# Patient Record
Sex: FEMALE | URBAN - METROPOLITAN AREA
[De-identification: names, ages, dates, MRNs, and addresses within clinical notes are randomized per-mention and may not be internally consistent; named-entity substitution may affect disease eponyms.]

---

## 2021-07-07 ENCOUNTER — APPOINTMENT (RX ONLY)
Dept: URBAN - METROPOLITAN AREA CLINIC 124 | Facility: CLINIC | Age: 51
Setting detail: DERMATOLOGY
End: 2021-07-07

## 2021-07-07 DIAGNOSIS — L82.1 OTHER SEBORRHEIC KERATOSIS: ICD-10-CM

## 2021-07-07 DIAGNOSIS — D22 MELANOCYTIC NEVI: ICD-10-CM

## 2021-07-07 DIAGNOSIS — Z71.89 OTHER SPECIFIED COUNSELING: ICD-10-CM

## 2021-07-07 PROBLEM — D22.5 MELANOCYTIC NEVI OF TRUNK: Status: ACTIVE | Noted: 2021-07-07

## 2021-07-07 PROBLEM — D22.39 MELANOCYTIC NEVI OF OTHER PARTS OF FACE: Status: ACTIVE | Noted: 2021-07-07

## 2021-07-07 PROCEDURE — 99203 OFFICE O/P NEW LOW 30 MIN: CPT

## 2021-07-07 PROCEDURE — ? COUNSELING

## 2021-07-07 PROCEDURE — ? DEFER

## 2021-07-07 PROCEDURE — ? SUNSCREEN RECOMMENDATIONS

## 2021-07-07 ASSESSMENT — LOCATION ZONE DERM
LOCATION ZONE: FACE
LOCATION ZONE: TRUNK

## 2021-07-07 ASSESSMENT — LOCATION SIMPLE DESCRIPTION DERM
LOCATION SIMPLE: LEFT CHEEK
LOCATION SIMPLE: RIGHT CHEEK
LOCATION SIMPLE: RIGHT UPPER BACK

## 2021-07-07 ASSESSMENT — LOCATION DETAILED DESCRIPTION DERM
LOCATION DETAILED: LEFT INFERIOR CENTRAL MALAR CHEEK
LOCATION DETAILED: RIGHT MEDIAL MALAR CHEEK
LOCATION DETAILED: RIGHT MEDIAL UPPER BACK

## 2021-07-07 NOTE — PROCEDURE: DEFER
Reason To Defer Override: [place]
Detail Level: Detailed
Introduction Text (Please End With A Colon): The following procedure was deferred:

## 2023-04-04 ENCOUNTER — APPOINTMENT (RX ONLY)
Dept: URBAN - METROPOLITAN AREA CLINIC 124 | Facility: CLINIC | Age: 53
Setting detail: DERMATOLOGY
End: 2023-04-04

## 2023-04-04 DIAGNOSIS — L81.4 OTHER MELANIN HYPERPIGMENTATION: ICD-10-CM

## 2023-04-04 DIAGNOSIS — Z71.89 OTHER SPECIFIED COUNSELING: ICD-10-CM

## 2023-04-04 DIAGNOSIS — D485 NEOPLASM OF UNCERTAIN BEHAVIOR OF SKIN: ICD-10-CM

## 2023-04-04 PROBLEM — D48.5 NEOPLASM OF UNCERTAIN BEHAVIOR OF SKIN: Status: ACTIVE | Noted: 2023-04-04

## 2023-04-04 PROCEDURE — 99212 OFFICE O/P EST SF 10 MIN: CPT | Mod: 25

## 2023-04-04 PROCEDURE — ? COUNSELING

## 2023-04-04 PROCEDURE — ? BIOPSY BY SHAVE METHOD

## 2023-04-04 PROCEDURE — ? SUNSCREEN RECOMMENDATIONS

## 2023-04-04 PROCEDURE — 11102 TANGNTL BX SKIN SINGLE LES: CPT

## 2023-04-04 ASSESSMENT — LOCATION DETAILED DESCRIPTION DERM: LOCATION DETAILED: RIGHT INFERIOR MEDIAL UPPER BACK

## 2023-04-04 ASSESSMENT — LOCATION ZONE DERM: LOCATION ZONE: TRUNK

## 2023-04-04 ASSESSMENT — LOCATION SIMPLE DESCRIPTION DERM: LOCATION SIMPLE: RIGHT UPPER BACK

## 2025-07-09 ENCOUNTER — APPOINTMENT (OUTPATIENT)
Dept: URBAN - METROPOLITAN AREA CLINIC 124 | Facility: CLINIC | Age: 55
Setting detail: DERMATOLOGY
End: 2025-07-09

## 2025-07-09 DIAGNOSIS — L71.8 OTHER ROSACEA: ICD-10-CM

## 2025-07-09 PROCEDURE — ? PRESCRIPTION

## 2025-07-09 PROCEDURE — ? COUNSELING

## 2025-07-09 RX ORDER — IVERMECTIN 10 MG/G
CREAM TOPICAL
Qty: 30 | Refills: 11 | Status: ERX | COMMUNITY
Start: 2025-07-09

## 2025-07-09 RX ORDER — MINOCYCLINE HYDROCHLORIDE 100 MG/1
CAPSULE ORAL QD
Qty: 30 | Refills: 0 | Status: ERX | COMMUNITY
Start: 2025-07-09

## 2025-07-09 RX ADMIN — MINOCYCLINE HYDROCHLORIDE: 100 CAPSULE ORAL at 00:00

## 2025-07-09 RX ADMIN — IVERMECTIN: 10 CREAM TOPICAL at 00:00

## 2025-07-09 ASSESSMENT — LOCATION DETAILED DESCRIPTION DERM: LOCATION DETAILED: PHILTRUM

## 2025-07-09 ASSESSMENT — LOCATION ZONE DERM: LOCATION ZONE: LIP

## 2025-07-09 ASSESSMENT — LOCATION SIMPLE DESCRIPTION DERM: LOCATION SIMPLE: UPPER LIP
